# Patient Record
Sex: MALE | Race: OTHER | Employment: FULL TIME | URBAN - METROPOLITAN AREA
[De-identification: names, ages, dates, MRNs, and addresses within clinical notes are randomized per-mention and may not be internally consistent; named-entity substitution may affect disease eponyms.]

---

## 2024-01-29 ENCOUNTER — NON-PROVIDER VISIT (OUTPATIENT)
Dept: OCCUPATIONAL MEDICINE | Facility: CLINIC | Age: 24
End: 2024-01-29

## 2024-01-29 DIAGNOSIS — Z02.1 PRE-EMPLOYMENT DRUG SCREENING: ICD-10-CM

## 2024-01-29 LAB
AMP AMPHETAMINE: NORMAL
COC COCAINE: NORMAL
INT CON NEG: NORMAL
INT CON POS: NORMAL
MET METHAMPHETAMINES: NORMAL
OPI OPIATES: NORMAL
PCP PHENCYCLIDINE: NORMAL
POC DRUG COMMENT 753798-OCCUPATIONAL HEALTH: NORMAL
THC: NORMAL

## 2024-01-29 PROCEDURE — 80305 DRUG TEST PRSMV DIR OPT OBS: CPT | Performed by: PREVENTIVE MEDICINE

## 2024-05-27 ENCOUNTER — HOSPITAL ENCOUNTER (OUTPATIENT)
Dept: RADIOLOGY | Facility: MEDICAL CENTER | Age: 24
End: 2024-05-27
Attending: PHYSICIAN ASSISTANT
Payer: COMMERCIAL

## 2024-05-27 ENCOUNTER — OFFICE VISIT (OUTPATIENT)
Dept: URGENT CARE | Facility: PHYSICIAN GROUP | Age: 24
End: 2024-05-27
Payer: COMMERCIAL

## 2024-05-27 VITALS
BODY MASS INDEX: 33.57 KG/M2 | TEMPERATURE: 98.8 F | HEIGHT: 75 IN | OXYGEN SATURATION: 98 % | DIASTOLIC BLOOD PRESSURE: 78 MMHG | RESPIRATION RATE: 13 BRPM | HEART RATE: 90 BPM | SYSTOLIC BLOOD PRESSURE: 120 MMHG | WEIGHT: 270 LBS

## 2024-05-27 DIAGNOSIS — S89.92XA INJURY OF LEFT KNEE, INITIAL ENCOUNTER: ICD-10-CM

## 2024-05-27 DIAGNOSIS — S86.912A KNEE STRAIN, LEFT, INITIAL ENCOUNTER: ICD-10-CM

## 2024-05-27 DIAGNOSIS — M25.462 EFFUSION OF LEFT KNEE: ICD-10-CM

## 2024-05-27 PROCEDURE — 3074F SYST BP LT 130 MM HG: CPT | Performed by: PHYSICIAN ASSISTANT

## 2024-05-27 PROCEDURE — 99203 OFFICE O/P NEW LOW 30 MIN: CPT | Performed by: PHYSICIAN ASSISTANT

## 2024-05-27 PROCEDURE — 3078F DIAST BP <80 MM HG: CPT | Performed by: PHYSICIAN ASSISTANT

## 2024-05-27 NOTE — LETTER
May 27, 2024         Patient: Tremaine Nicolas   YOB: 2000   Date of Visit: 5/27/2024           To Whom it May Concern:    Tremaine Nicolas was seen in my clinic on 5/27/2024.  Please excuse his absence from work for the next 2 days.      Sincerely,           Chen Velasquez P.A.-C.  Electronically Signed

## 2024-06-02 ENCOUNTER — TELEPHONE (OUTPATIENT)
Dept: URGENT CARE | Facility: PHYSICIAN GROUP | Age: 24
End: 2024-06-02
Payer: COMMERCIAL

## 2024-06-02 NOTE — TELEPHONE ENCOUNTER
Pt called and LVM asking whether he can replace the knee brace with a smaller size, knee brace that was placed during his visit fits too big. Attempted to call pt and was directed to VM. If able to reach pt, will let him know that he can return to the clinic to get the proper size.

## 2024-06-03 ASSESSMENT — ENCOUNTER SYMPTOMS
FEVER: 0
FOCAL WEAKNESS: 0

## 2024-06-03 NOTE — PROGRESS NOTES
"Subjective     Tremaine Nicolas is a 24 y.o. male who presents with Knee Injury (Sx started 1 day ago, Left Knee, Knee bent in a lot of ways while walking, knee popped. Swelling, can't bend it or move it. )    HPI:  Tremaine Nicolas is a 24 y.o. male who presents today for evaluation of an injury to his left knee. He says that he slipped while walking and his left leg/knee bent awkwardly as it slid out from him and he is not sure if it twisted or hyperextended. He says he felt something \"pop\". He has tried icing it and taking OTC analgesics without much improvement. He has also been using someone's knee brace and crutches. It hurts to put any weight on it and he is unable to bend it.          Review of Systems   Constitutional:  Negative for fever.   Musculoskeletal:  Positive for joint pain.   Neurological:  Negative for focal weakness.         PMH:  has no past medical history on file.  MEDS: No current outpatient medications on file.  ALLERGIES: No Known Allergies  SURGHX: No past surgical history on file.  SOCHX:  reports that he has never smoked. He has never used smokeless tobacco. He reports current alcohol use. He reports that he does not use drugs.  FH: Family history was reviewed, no pertinent findings to report        Objective     /78 (BP Location: Left arm, Patient Position: Sitting, BP Cuff Size: Adult long)   Pulse 90   Temp 37.1 °C (98.8 °F) (Temporal)   Resp 13   Ht 1.905 m (6' 3\")   Wt 122 kg (270 lb)   SpO2 98%   BMI 33.75 kg/m²      Physical Exam  Constitutional:       General: He is not in acute distress.     Appearance: He is not diaphoretic.   HENT:      Head: Normocephalic and atraumatic.      Right Ear: External ear normal.      Left Ear: External ear normal.   Eyes:      Conjunctiva/sclera: Conjunctivae normal.      Pupils: Pupils are equal, round, and reactive to light.   Pulmonary:      Effort: Pulmonary effort is normal. No respiratory distress. "   Musculoskeletal:      Cervical back: Normal range of motion.      Left knee: Swelling, effusion and bony tenderness present. No ecchymosis. Decreased range of motion (most notable with flexion). Tenderness present over the MCL and patellar tendon.      Comments: Unable to tolerate Skip tests or drawer tests on today's exam   Skin:     Findings: No rash.   Neurological:      Mental Status: He is alert and oriented to person, place, and time.   Psychiatric:         Mood and Affect: Mood and affect normal.         Cognition and Memory: Memory normal.         Judgment: Judgment normal.       DX-KNEE COMPLETE 4+ LEFT  IMPRESSION:  1.  There is a small joint effusion with anterior soft tissue swelling.  2.  No fracture.  3.  There is a slightly high riding patella/patellar kaye with indistinctness of the infrapatellar fat pad. Possibility of injury to the infrapatellar tendon could be considered and clinical correlation is recommended.    Assessment & Plan       1. Injury of left knee, initial encounter  - DX-KNEE COMPLETE 4+ LEFT; Future  - Referral to Orthopedics    2. Effusion of left knee  - Referral to Orthopedics    3. Knee strain, left, initial encounter  - Referral to Orthopedics    Exam and x-ray findings are concerning for internal derangement. Patient given a knee brace and crutches and advised to be non weight bearing. RICE therapies and use of OTC analgesics discussed. Referral to ortho placed for more definitive management.            Differential Diagnosis, natural history, and supportive care discussed. Return to the Urgent Care or follow up with your PCP if symptoms fail to resolve, or for any new or worsening symptoms. Emergency room precautions discussed. Patient and/or family appears understanding of information.

## 2024-06-07 ENCOUNTER — TELEPHONE (OUTPATIENT)
Dept: URGENT CARE | Facility: PHYSICIAN GROUP | Age: 24
End: 2024-06-07
Payer: COMMERCIAL

## 2024-06-07 NOTE — TELEPHONE ENCOUNTER
Patient called to see if he can comein to get a new knee brace due to the swelling going down and the brace that he currently has is too loose and is falling off. Patient was told to come back in to get a new brace.

## 2024-06-07 NOTE — TELEPHONE ENCOUNTER
Called patient back to advise him that he may have to pay for the knee brace. Patient understood.